# Patient Record
Sex: FEMALE | Race: WHITE | NOT HISPANIC OR LATINO | Employment: OTHER | URBAN - METROPOLITAN AREA
[De-identification: names, ages, dates, MRNs, and addresses within clinical notes are randomized per-mention and may not be internally consistent; named-entity substitution may affect disease eponyms.]

---

## 2023-10-05 ENCOUNTER — LAB REQUISITION (OUTPATIENT)
Dept: LAB | Facility: LAB | Age: 88
End: 2023-10-05
Payer: COMMERCIAL

## 2023-10-05 DIAGNOSIS — A04.72 ENTEROCOLITIS DUE TO CLOSTRIDIUM DIFFICILE, NOT SPECIFIED AS RECURRENT: ICD-10-CM

## 2023-10-05 PROCEDURE — 87493 C DIFF AMPLIFIED PROBE: CPT

## 2023-10-06 LAB — C DIF TOX TCDA+TCDB STL QL NAA+PROBE: NOT DETECTED

## 2024-01-25 ENCOUNTER — LAB (OUTPATIENT)
Dept: LAB | Facility: LAB | Age: 89
End: 2024-01-25
Payer: COMMERCIAL

## 2024-01-25 DIAGNOSIS — H53.9 UNSPECIFIED VISUAL DISTURBANCE: Primary | ICD-10-CM

## 2024-01-25 LAB — ERYTHROCYTE [SEDIMENTATION RATE] IN BLOOD BY WESTERGREN METHOD: 37 MM/H (ref 0–30)

## 2024-01-25 PROCEDURE — 36415 COLL VENOUS BLD VENIPUNCTURE: CPT

## 2024-01-25 PROCEDURE — 85652 RBC SED RATE AUTOMATED: CPT

## 2024-02-29 ENCOUNTER — LAB (OUTPATIENT)
Dept: LAB | Facility: LAB | Age: 89
End: 2024-02-29
Payer: COMMERCIAL

## 2024-02-29 DIAGNOSIS — E87.6 HYPOKALEMIA: Primary | ICD-10-CM

## 2024-02-29 DIAGNOSIS — R51.9 HEADACHE, UNSPECIFIED: ICD-10-CM

## 2024-02-29 LAB
ANION GAP SERPL CALC-SCNC: 14 MMOL/L (ref 10–20)
BUN SERPL-MCNC: 23 MG/DL (ref 6–23)
CALCIUM SERPL-MCNC: 9 MG/DL (ref 8.6–10.3)
CHLORIDE SERPL-SCNC: 104 MMOL/L (ref 98–107)
CO2 SERPL-SCNC: 27 MMOL/L (ref 21–32)
CREAT SERPL-MCNC: 0.94 MG/DL (ref 0.5–1.05)
EGFRCR SERPLBLD CKD-EPI 2021: 57 ML/MIN/1.73M*2
ERYTHROCYTE [SEDIMENTATION RATE] IN BLOOD BY WESTERGREN METHOD: 16 MM/H (ref 0–30)
GLUCOSE SERPL-MCNC: 122 MG/DL (ref 74–99)
POTASSIUM SERPL-SCNC: 4.7 MMOL/L (ref 3.5–5.3)
SODIUM SERPL-SCNC: 140 MMOL/L (ref 136–145)

## 2024-02-29 PROCEDURE — 85652 RBC SED RATE AUTOMATED: CPT

## 2024-02-29 PROCEDURE — 36415 COLL VENOUS BLD VENIPUNCTURE: CPT

## 2024-02-29 PROCEDURE — 80048 BASIC METABOLIC PNL TOTAL CA: CPT

## 2024-05-02 ENCOUNTER — APPOINTMENT (OUTPATIENT)
Dept: RADIOLOGY | Facility: HOSPITAL | Age: 89
End: 2024-05-02
Payer: COMMERCIAL

## 2024-05-02 ENCOUNTER — APPOINTMENT (OUTPATIENT)
Dept: CARDIOLOGY | Facility: HOSPITAL | Age: 89
End: 2024-05-02
Payer: COMMERCIAL

## 2024-05-02 ENCOUNTER — HOSPITAL ENCOUNTER (EMERGENCY)
Facility: HOSPITAL | Age: 89
Discharge: HOME | End: 2024-05-02
Attending: STUDENT IN AN ORGANIZED HEALTH CARE EDUCATION/TRAINING PROGRAM
Payer: COMMERCIAL

## 2024-05-02 VITALS
DIASTOLIC BLOOD PRESSURE: 92 MMHG | SYSTOLIC BLOOD PRESSURE: 199 MMHG | TEMPERATURE: 97.7 F | HEIGHT: 59 IN | WEIGHT: 148.6 LBS | HEART RATE: 63 BPM | RESPIRATION RATE: 18 BRPM | OXYGEN SATURATION: 97 % | BODY MASS INDEX: 29.96 KG/M2

## 2024-05-02 DIAGNOSIS — M79.604 PAIN IN RIGHT LEG: ICD-10-CM

## 2024-05-02 DIAGNOSIS — M51.36 LUMBAR DEGENERATIVE DISC DISEASE: Primary | ICD-10-CM

## 2024-05-02 PROCEDURE — 99285 EMERGENCY DEPT VISIT HI MDM: CPT | Mod: 25

## 2024-05-02 PROCEDURE — 93971 EXTREMITY STUDY: CPT | Performed by: INTERNAL MEDICINE

## 2024-05-02 PROCEDURE — 73564 X-RAY EXAM KNEE 4 OR MORE: CPT | Mod: RT

## 2024-05-02 PROCEDURE — 72192 CT PELVIS W/O DYE: CPT | Mod: FOREIGN READ | Performed by: RADIOLOGY

## 2024-05-02 PROCEDURE — 72131 CT LUMBAR SPINE W/O DYE: CPT

## 2024-05-02 PROCEDURE — 72131 CT LUMBAR SPINE W/O DYE: CPT | Mod: FOREIGN READ | Performed by: RADIOLOGY

## 2024-05-02 PROCEDURE — 72192 CT PELVIS W/O DYE: CPT

## 2024-05-02 PROCEDURE — 99284 EMERGENCY DEPT VISIT MOD MDM: CPT | Performed by: STUDENT IN AN ORGANIZED HEALTH CARE EDUCATION/TRAINING PROGRAM

## 2024-05-02 PROCEDURE — 93971 EXTREMITY STUDY: CPT

## 2024-05-02 RX ORDER — HYDRALAZINE HYDROCHLORIDE 20 MG/ML
5 INJECTION INTRAMUSCULAR; INTRAVENOUS ONCE
Status: DISCONTINUED | OUTPATIENT
Start: 2024-05-02 | End: 2024-05-02

## 2024-05-02 RX ORDER — METOPROLOL SUCCINATE 25 MG/1
25 TABLET, EXTENDED RELEASE ORAL ONCE
Status: DISCONTINUED | OUTPATIENT
Start: 2024-05-02 | End: 2024-05-02

## 2024-05-02 RX ORDER — METOPROLOL SUCCINATE 25 MG/1
50 TABLET, EXTENDED RELEASE ORAL DAILY
Status: DISCONTINUED | OUTPATIENT
Start: 2024-05-03 | End: 2024-05-02

## 2024-05-02 RX ORDER — METOPROLOL SUCCINATE 25 MG/1
50 TABLET, EXTENDED RELEASE ORAL ONCE
Status: DISCONTINUED | OUTPATIENT
Start: 2024-05-02 | End: 2024-05-02

## 2024-05-02 RX ORDER — KETOROLAC TROMETHAMINE 15 MG/ML
15 INJECTION, SOLUTION INTRAMUSCULAR; INTRAVENOUS ONCE
Status: DISCONTINUED | OUTPATIENT
Start: 2024-05-02 | End: 2024-05-02

## 2024-05-02 ASSESSMENT — PAIN SCALES - GENERAL
PAINLEVEL_OUTOF10: 7
PAINLEVEL_OUTOF10: 0 - NO PAIN
PAINLEVEL_OUTOF10: 8

## 2024-05-02 ASSESSMENT — COLUMBIA-SUICIDE SEVERITY RATING SCALE - C-SSRS
2. HAVE YOU ACTUALLY HAD ANY THOUGHTS OF KILLING YOURSELF?: NO
1. IN THE PAST MONTH, HAVE YOU WISHED YOU WERE DEAD OR WISHED YOU COULD GO TO SLEEP AND NOT WAKE UP?: NO
6. HAVE YOU EVER DONE ANYTHING, STARTED TO DO ANYTHING, OR PREPARED TO DO ANYTHING TO END YOUR LIFE?: NO

## 2024-05-02 ASSESSMENT — LIFESTYLE VARIABLES
HAVE YOU EVER FELT YOU SHOULD CUT DOWN ON YOUR DRINKING: NO
HAVE PEOPLE ANNOYED YOU BY CRITICIZING YOUR DRINKING: NO
TOTAL SCORE: 0
EVER FELT BAD OR GUILTY ABOUT YOUR DRINKING: NO
EVER HAD A DRINK FIRST THING IN THE MORNING TO STEADY YOUR NERVES TO GET RID OF A HANGOVER: NO

## 2024-05-02 ASSESSMENT — PAIN - FUNCTIONAL ASSESSMENT
PAIN_FUNCTIONAL_ASSESSMENT: 0-10
PAIN_FUNCTIONAL_ASSESSMENT: 0-10

## 2024-05-03 NOTE — ED PROVIDER NOTES
EMERGENCY DEPARTMENT ENCOUNTER      Pt Name: Rufus Antonio  MRN: [id number]  Birthdate 10/10/1930  Date of evaluation: 5/2/2024  Provider: Varun Bravo MD    CHIEF COMPLAINT       Chief Complaint   Patient presents with    Hip Pain     Right leg pain         HISTORY OF PRESENT ILLNESS    HPI  Patient is a 89-year-old female history of A-fib on Xarelto, HFpEF, CKD, sick sinus syndrome status post pacemaker, polymyalgia rheumatica, HTN presenting with right leg pain.  Approximately 2 weeks ago, she had a presyncopal event, bent over and felt a popping sensation in her lower back/hip.  Since then, she has been having worsening pain down the right leg.  It originates in the low back/glutes and radiates down to the knee.  Pain is 6/10, sharp, worsened with walking/standing, without consistent alleviating factors.  She is given minimal relief from over-the-counter medications.  She is having worsening difficulty getting around her apartment.  She has been furniture surfing.  She is otherwise asymptomatic.    Nursing Notes were reviewed.    PAST MEDICAL HISTORY   History reviewed. No pertinent past medical history.      SURGICAL HISTORY     History reviewed. No pertinent surgical history.      CURRENT MEDICATIONS       Discharge Medication List as of 5/2/2024 11:04 PM        CONTINUE these medications which have NOT CHANGED    Details   potassium chloride CR (Klor-Con) 10 mEq ER tablet TAKE 1 TABLET BY MOUTH ONCE DAILY, Starting Tue 9/19/2023, Until Wed 9/18/2024, Normal      vancomycin (Vancocin) 125 mg capsule TAKE 1 CAPSULE BY MOUTH EVERY 6 HOURS FOR 8 DAYS THEN TAKE 1 CAPSULE EVERY 8 HOURS FOR 1 WEEK THEN TAKE 1 CAPSULE EVERY 12 HOURS FOR 1 WEEK THEN TAKE 1 CAPSULE ONCE DAILY FOR 1 WEEK, THEN 1 CAPSULE EVERY OTHER DAY FOR 1 WEEK, Starting Tue 9/19/2023, Unti l Wed 9/18/2024 at 2359, Normal             ALLERGIES     Patient has no known allergies.    FAMILY HISTORY     No family history on file.       SOCIAL HISTORY        Social History     Socioeconomic History    Marital status:      Spouse name: None    Number of children: None    Years of education: None    Highest education level: None   Occupational History    None   Tobacco Use    Smoking status: None    Smokeless tobacco: None   Substance and Sexual Activity    Alcohol use: None    Drug use: None    Sexual activity: None   Other Topics Concern    None   Social History Narrative    None     Social Determinants of Health     Financial Resource Strain: Low Risk  (10/24/2023)    Received from [facility]    Overall Financial Resource Strain (CARDIA)     Difficulty of Paying Living Expenses: Not hard at all   Food Insecurity: No Food Insecurity (10/24/2023)    Received from [facility]    Hunger Vital Sign     Worried About Running Out of Food in the Last Year: Never true     Ran Out of Food in the Last Year: Never true   Transportation Needs: No Transportation Needs (10/24/2023)    Received from [facility]    PRAPARE - Transportation     Lack of Transportation (Medical): No     Lack of Transportation (Non-Medical): No   Physical Activity: Inactive (11/14/2022)    Received from [facility]    Exercise Vital Sign     Days of Exercise per Week: 0 days     Minutes of Exercise per Session: 0 min   Stress: No Stress Concern Present (10/24/2023)    Received from [facility]    [place] of [employer] - Occupational Stress Questionnaire     Feeling of Stress : Only a little   Social Connections: Moderately Integrated (11/14/2022)    Received from [facility]    Social Connection and Isolation Panel [NHANES]     Frequency of Communication with Friends and Family: Three times a week     Frequency of Social Gatherings with Friends and Family: Twice a week     Attends Jainism Services: 1 to 4 times per year     Active Member of Clubs or Organizations: No     Attends Club or Organization Meetings: 1 to 4 times per year     Marital  Status:    Intimate Partner Violence: Not on file   Housing Stability: Low Risk  (4/14/2023)    Received from [facility]    Housing Stability Vital Sign     Unable to Pay for Housing in the Last Year: No     Number of Places Lived in the Last Year: 1     Unstable Housing in the Last Year: No       SCREENINGS                        PHYSICAL EXAM    (up to 7 for level 4, 8 or more for level 5)     ED Triage Vitals [05/02/24 1733]   Temperature Heart Rate Respirations BP   36.5 °C (97.7 °F) 82 18 176/80      Pulse Ox Temp Source Heart Rate Source Patient Position   99 % Temporal Monitor Sitting      BP Location FiO2 (%)     Right arm --       Physical Exam  Vitals and nursing note reviewed.   Constitutional:       General: She is not in acute distress.     Appearance: She is not toxic-appearing.   HENT:      Head: Normocephalic and atraumatic.      Nose: Nose normal.      Mouth/Throat:      Mouth: Mucous membranes are moist.      Pharynx: Oropharynx is clear.   Eyes:      Extraocular Movements: Extraocular movements intact.      Conjunctiva/sclera: Conjunctivae normal.   Cardiovascular:      Rate and Rhythm: Normal rate and regular rhythm.      Pulses: Normal pulses.      Heart sounds: Normal heart sounds.   Pulmonary:      Effort: Pulmonary effort is normal. No respiratory distress.      Breath sounds: Normal breath sounds.   Abdominal:      General: There is no distension.      Palpations: Abdomen is soft.      Tenderness: There is no abdominal tenderness.   Musculoskeletal:      Cervical back: Normal range of motion and neck supple.      Comments: Pain to palpation along the lower lumbar spine.  No palpable step-offs or deformities.  No pain to palpation of the hip or greater trochanter.  Pain to palpation of the posterior knee.   Skin:     General: Skin is warm and dry.   Neurological:      General: No focal deficit present.          DIAGNOSTIC RESULTS     LABS:  Labs Reviewed - No data to  display    All other labs were within normal range or not returned as of this dictation.    Imaging  CT lumbar spine wo IV contrast   Final Result   1.  No acute lumbar vertebral body compression/fracture.   2.  Multilevel degenerative disc disease of the lumbar spine as   described above.   3.  Mild central canal stenosis noted at the L3-L4 and L4-L5 levels.   4.  Bilateral neuroforamina narrowing at the L3-L4, L4-L5.  There is     left neuroforaminal narrowing noted at the L5-S1 level.   5.  No acute intrapelvic fracture or dislocation visualized.   .   Signed by John Wu MD      CT pelvis wo IV contrast   Final Result   1.  No acute lumbar vertebral body compression/fracture.   2.  Multilevel degenerative disc disease of the lumbar spine as   described above.   3.  Mild central canal stenosis noted at the L3-L4 and L4-L5 levels.   4.  Bilateral neuroforamina narrowing at the L3-L4, L4-L5.  There is     left neuroforaminal narrowing noted at the L5-S1 level.   5.  No acute intrapelvic fracture or dislocation visualized.   .   Signed by John Wu MD      Vascular US lower extremity venous duplex right   Final Result      XR knee right 4+ views   Final Result   Degenerative changes without osseous injury evident.        MACRO:   None        Signed by: Donald Bliss 5/2/2024 8:46 PM   Dictation workstation:   [id number]           Procedures  Procedures     EMERGENCY DEPARTMENT COURSE/MDM:     Diagnoses as of 05/03/24 0125   Lumbar degenerative disc disease        Medical Decision Making  History obtained from the patient and her daughter.  Records including labs, imaging, notes reviewed.  Patient declined pain medications.  Primary concern for possible fracture of the lumbar spine, right hip, right knee, DVT.  Venous duplex of the right lower extremity was negative for DVT.  CT lumbar spine, pelvis, x-ray of the right knee were negative for acute fractures.  Degenerative changes only.  Patient had requested  that she receive her blood pressure medication as she was persistently hypertensive in the ER and declined a one-time push.  We attempted multiple times to find the correct dosage as they were unaware of it themselves and all of her history to the [facility].  Reportedly initially 25 mg ultimately 50.  These were ordered with the patient's results ultimately came in before she was to receive the dose.  Patient was asymptomatic throughout.  She was ultimately discharged home in satisfactory condition with instruction for supportive measures and to take her p.m. metoprolol.  All questions answered and return precautions discussed.    Patient and or family in agreement and understanding of treatment plan.  All questions answered.      I reviewed the case with the attending ED physician. The attending ED physician agrees with the plan. Patient and/or patient´s representative was counseled regarding labs, imaging, likely diagnosis, and plan. All questions were answered.    ED Medications administered this visit:    Medications - No data to display      New Prescriptions from this visit:    Discharge Medication List as of 5/2/2024 11:04 PM          Follow-up:  SARANYA Bell MD  [address]  [place] [address]  [phone]    Call in 2 days  Follow up with primary care physician in the next 48 hours. Return to ED if experiencing worsening symptoms, back pain, numbness, weakness, loss of bowel or bladder control, numbness in groin, inability to walk        Final Impression:   1. Lumbar degenerative disc disease    2. Pain in right leg          (Please note that portions of this note were completed with a voice recognition program.  Efforts were made to edit the dictations but occasionally words are mis-transcribed.)     Varun Bravo MD  Resident  05/03/24 0126

## 2024-05-22 ENCOUNTER — HOSPITAL ENCOUNTER (EMERGENCY)
Facility: HOSPITAL | Age: 89
Discharge: HOME | End: 2024-05-22
Attending: EMERGENCY MEDICINE
Payer: COMMERCIAL

## 2024-05-22 ENCOUNTER — APPOINTMENT (OUTPATIENT)
Dept: CARDIOLOGY | Facility: HOSPITAL | Age: 89
End: 2024-05-22
Payer: COMMERCIAL

## 2024-05-22 ENCOUNTER — APPOINTMENT (OUTPATIENT)
Dept: RADIOLOGY | Facility: HOSPITAL | Age: 89
End: 2024-05-22
Payer: COMMERCIAL

## 2024-05-22 VITALS
WEIGHT: 142 LBS | TEMPERATURE: 97.3 F | DIASTOLIC BLOOD PRESSURE: 94 MMHG | HEIGHT: 59 IN | OXYGEN SATURATION: 96 % | BODY MASS INDEX: 28.63 KG/M2 | SYSTOLIC BLOOD PRESSURE: 205 MMHG | HEART RATE: 70 BPM | RESPIRATION RATE: 18 BRPM

## 2024-05-22 DIAGNOSIS — N30.90 CYSTITIS: Primary | ICD-10-CM

## 2024-05-22 LAB
ALBUMIN SERPL BCP-MCNC: 4.1 G/DL (ref 3.4–5)
ALP SERPL-CCNC: 46 U/L (ref 33–136)
ALT SERPL W P-5'-P-CCNC: 18 U/L (ref 7–45)
ANION GAP SERPL CALC-SCNC: 15 MMOL/L (ref 10–20)
APPEARANCE UR: CLEAR
AST SERPL W P-5'-P-CCNC: 25 U/L (ref 9–39)
BACTERIA #/AREA URNS AUTO: ABNORMAL /HPF
BILIRUB SERPL-MCNC: 0.7 MG/DL (ref 0–1.2)
BILIRUB UR STRIP.AUTO-MCNC: NEGATIVE MG/DL
BUN SERPL-MCNC: 18 MG/DL (ref 6–23)
CALCIUM SERPL-MCNC: 9.2 MG/DL (ref 8.6–10.3)
CHLORIDE SERPL-SCNC: 100 MMOL/L (ref 98–107)
CO2 SERPL-SCNC: 27 MMOL/L (ref 21–32)
COLOR UR: ABNORMAL
CREAT SERPL-MCNC: 0.92 MG/DL (ref 0.5–1.05)
EGFRCR SERPLBLD CKD-EPI 2021: 58 ML/MIN/1.73M*2
ERYTHROCYTE [DISTWIDTH] IN BLOOD BY AUTOMATED COUNT: 13.8 % (ref 11.5–14.5)
GLUCOSE SERPL-MCNC: 126 MG/DL (ref 74–99)
GLUCOSE UR STRIP.AUTO-MCNC: NORMAL MG/DL
HCT VFR BLD AUTO: 38.9 % (ref 36–46)
HGB BLD-MCNC: 12.6 G/DL (ref 12–16)
KETONES UR STRIP.AUTO-MCNC: NEGATIVE MG/DL
LEUKOCYTE ESTERASE UR QL STRIP.AUTO: ABNORMAL
MCH RBC QN AUTO: 34.6 PG (ref 26–34)
MCHC RBC AUTO-ENTMCNC: 32.4 G/DL (ref 32–36)
MCV RBC AUTO: 107 FL (ref 80–100)
NITRITE UR QL STRIP.AUTO: NEGATIVE
NRBC BLD-RTO: 0 /100 WBCS (ref 0–0)
PH UR STRIP.AUTO: 5.5 [PH]
PLATELET # BLD AUTO: 190 X10*3/UL (ref 150–450)
POTASSIUM SERPL-SCNC: 3.8 MMOL/L (ref 3.5–5.3)
PROT SERPL-MCNC: 7.3 G/DL (ref 6.4–8.2)
PROT UR STRIP.AUTO-MCNC: NEGATIVE MG/DL
RBC # BLD AUTO: 3.64 X10*6/UL (ref 4–5.2)
RBC # UR STRIP.AUTO: ABNORMAL /UL
RBC #/AREA URNS AUTO: ABNORMAL /HPF
SODIUM SERPL-SCNC: 138 MMOL/L (ref 136–145)
SP GR UR STRIP.AUTO: 1.01
UROBILINOGEN UR STRIP.AUTO-MCNC: NORMAL MG/DL
WBC # BLD AUTO: 8 X10*3/UL (ref 4.4–11.3)
WBC #/AREA URNS AUTO: >50 /HPF
WBC CLUMPS #/AREA URNS AUTO: ABNORMAL /HPF

## 2024-05-22 PROCEDURE — 85027 COMPLETE CBC AUTOMATED: CPT | Performed by: EMERGENCY MEDICINE

## 2024-05-22 PROCEDURE — 2550000001 HC RX 255 CONTRASTS: Performed by: EMERGENCY MEDICINE

## 2024-05-22 PROCEDURE — 80053 COMPREHEN METABOLIC PANEL: CPT | Performed by: EMERGENCY MEDICINE

## 2024-05-22 PROCEDURE — 74177 CT ABD & PELVIS W/CONTRAST: CPT

## 2024-05-22 PROCEDURE — 81003 URINALYSIS AUTO W/O SCOPE: CPT | Performed by: EMERGENCY MEDICINE

## 2024-05-22 PROCEDURE — 93005 ELECTROCARDIOGRAM TRACING: CPT

## 2024-05-22 PROCEDURE — 74177 CT ABD & PELVIS W/CONTRAST: CPT | Mod: FOREIGN READ | Performed by: RADIOLOGY

## 2024-05-22 PROCEDURE — 36415 COLL VENOUS BLD VENIPUNCTURE: CPT | Performed by: EMERGENCY MEDICINE

## 2024-05-22 PROCEDURE — 87086 URINE CULTURE/COLONY COUNT: CPT | Mod: STJLAB | Performed by: EMERGENCY MEDICINE

## 2024-05-22 PROCEDURE — 99285 EMERGENCY DEPT VISIT HI MDM: CPT | Mod: 25

## 2024-05-22 PROCEDURE — 99285 EMERGENCY DEPT VISIT HI MDM: CPT | Performed by: EMERGENCY MEDICINE

## 2024-05-22 RX ORDER — METRONIDAZOLE 500 MG/1
500 TABLET ORAL 3 TIMES DAILY
Qty: 30 TABLET | Refills: 0 | Status: SHIPPED | OUTPATIENT
Start: 2024-05-22 | End: 2024-06-01

## 2024-05-22 RX ORDER — CEPHALEXIN 500 MG/1
500 CAPSULE ORAL 2 TIMES DAILY
Qty: 14 CAPSULE | Refills: 0 | Status: SHIPPED | OUTPATIENT
Start: 2024-05-22 | End: 2024-05-22 | Stop reason: WASHOUT

## 2024-05-22 RX ORDER — METRONIDAZOLE 500 MG/1
500 TABLET ORAL 3 TIMES DAILY
Qty: 30 TABLET | Refills: 0 | Status: SHIPPED | OUTPATIENT
Start: 2024-05-22 | End: 2024-05-22 | Stop reason: WASHOUT

## 2024-05-22 RX ORDER — CEPHALEXIN 500 MG/1
500 CAPSULE ORAL 2 TIMES DAILY
Qty: 14 CAPSULE | Refills: 0 | Status: SHIPPED | OUTPATIENT
Start: 2024-05-22 | End: 2024-05-29

## 2024-05-22 RX ADMIN — IOHEXOL 75 ML: 350 INJECTION, SOLUTION INTRAVENOUS at 20:35

## 2024-05-22 ASSESSMENT — PAIN DESCRIPTION - PROGRESSION: CLINICAL_PROGRESSION: NOT CHANGED

## 2024-05-22 ASSESSMENT — PAIN SCALES - GENERAL
PAINLEVEL_OUTOF10: 0 - NO PAIN
PAINLEVEL_OUTOF10: 0 - NO PAIN

## 2024-05-22 ASSESSMENT — COLUMBIA-SUICIDE SEVERITY RATING SCALE - C-SSRS
6. HAVE YOU EVER DONE ANYTHING, STARTED TO DO ANYTHING, OR PREPARED TO DO ANYTHING TO END YOUR LIFE?: NO
1. IN THE PAST MONTH, HAVE YOU WISHED YOU WERE DEAD OR WISHED YOU COULD GO TO SLEEP AND NOT WAKE UP?: NO
2. HAVE YOU ACTUALLY HAD ANY THOUGHTS OF KILLING YOURSELF?: NO

## 2024-05-22 ASSESSMENT — PAIN - FUNCTIONAL ASSESSMENT: PAIN_FUNCTIONAL_ASSESSMENT: 0-10

## 2024-05-22 NOTE — ED PROVIDER NOTES
HPI   Chief Complaint   Patient presents with    Difficulty Urinating     Pain when urinating       This is a 89-year-old female who presents to ER with chief complaint of pain with urination.  This started last Monday, 9 days ago.  She saw her primary care doctor on Thursday and he did urine studies from the office and then she had lab work done on Saturday.  Patient does had C. Difficile infection in September of this past year.  Her primary care doctor was not wanting to start her on antibiotics unless absolutely necessary.  They did refer her to infectious disease and Dr. Rivera wanted her to come to the office but she did not want to go.  She contacted her primary care doctor again and they advised her to come to the ER to get a repeat urine study done.  Patient was just seen in the ER around 2 weeks ago because she was having back pain and right hip pain.  She states that she got lightheaded with this pain.  She did not follow-up with orthopedics for this pain.  She still having occasional back pain.  She states her stools been chronically loose since she had C. difficile.  There is no fevers or chills or diaphoresis.  No sore throat or nasal congestion or drainage.  No chest pain or shortness of breath.  No cough.  No anterior abdominal pain currently.  She does get occasional suprapubic pain.  No nausea or vomiting.  No dizziness currently.  She does get occasional dizziness when her pain gets worse.    Past Medical History: C. difficile, urinary tract infection, hypertension and hypotension.  She is on midodrine and metoprolol.  No diabetes.    Surgical History: Bladder sling    Medications: See Nursing Notes    Allergies: Codeine    Social History: No tobacco or drugs or alcohol    Primary Care Physician:  Gerhard    Unless otherwise stated in this report the patient's positive and negative responses for review of systems for constitutional, eyes, ENT, cardiovascular, respiratory, gastrointestinal,  neurological, genitourinary, musculoskeletal, and integument systems and related systems to the presenting problem are either as stated in the HPI or were not pertinent or were negative for the symptoms and/or complaints related to the presenting medical problem.      EXAM:  Vital signs reviewed  General:  Appears well  Alert  HEENT:  Head atraumatic  Eyes normal inspection  Normal ENT inspection  NECK:  Normal inspection  RESPIRATORY:  Normal breath sounds  No chest wall tenderness  No respiratory distress  CVS:  Heart rate and rhythm regular  No Murmurs  ABDOMEN/GI:  Soft  Non-tender, no reproducible pain to palpation  Bowel sounds normal  FEMALE GENITAL:  []  MALE GENITAL:  []  RECTAL:  []  BACK:  Normal inspection, no midline tenderness  EXTREMITIES:  Non-Tender  Full ROM  Normal appearance  NEURO:  Alert and oriented X 3  CN's normal as tested  Sensation normal  Motor normal  PSYCH:  Mood normal  Affect normal  SKIN:  Color normal  No rash  Warm  Dry                                Perry Coma Scale Score: 15                     Patient History   History reviewed. No pertinent past medical history.  History reviewed. No pertinent surgical history.  No family history on file.  Social History     Tobacco Use    Smoking status: Not on file    Smokeless tobacco: Not on file   Substance Use Topics    Alcohol use: Not on file    Drug use: Not on file       Physical Exam   ED Triage Vitals [05/22/24 1614]   Temperature Heart Rate Respirations BP   36.3 °C (97.3 °F) 90 16 (!) 204/95      Pulse Ox Temp Source Heart Rate Source Patient Position   94 % Temporal Monitor Sitting      BP Location FiO2 (%)     Right arm --       Physical Exam    ED Course & MDM   Diagnoses as of 05/23/24 0829   Cystitis       Medical Decision Making  Urine does show 1+ bacteria with greater than 50 white cells.  No squamous cells.  There is 500 leukocyte esterase and nitrites are negative.  Urine culture is ordered.    We did discuss  empirically starting her on an antibiotic that has less likelihood of causing C. difficile such as Keflex.  We also did discuss potentially starting her on Flagyl empirically.  We did discuss that Flagyl has had increasing resistance to C. difficile, but it is less expensive than oral vancomycin.  Patient prefers to wait for her culture to come back.    Due to this pain she was having we did do CT imaging with contrast.  If the patient does have pyelonephritis we will start antibiotics now.  Patient has been having these urinary symptoms for around 9 days now.  If this was a significant urinary tract infection, it would most likely have led to pyelonephritis or significant cystitis by now.    GFR is 58.  Glucose is 126.  No leukocytosis.  No significant anemia.    EKG: Electronic atrial pacemaker with a rate of 64.  PA interval 218.  QTc 406.  No ST changes.    Patient states she has a history of very erratic blood pressure.  She states her cardiologist keeps her at an elevated blood pressure.  She states it is normally around 1 70-1 80 at her bedtime.  She has not taken her evening metoprolol tonight.  She states that she does have issues with orthostasis and dropping her blood pressure 50-60 points.  It is not abnormal for her to have a blood pressure in the 190s.    GFR is 58.  Rest of the chemistry is unremarkable.  No leukocytosis.  No significant anemia.    CT:  Questionable inflammatory changes in a decompressed bladder.  Please   correlate for component of cystitis/urinary tract infection.   No acute plantar changes in the abdomen or pelvis.   Advanced sigmoid colon diverticulosis without evidence of   diverticulitis.   Left adnexal cysts measures 1.5 cm.     I did discuss the CT findings and UA findings with patient and her daughter.  With her having these findings, I did feel that we should start abx.  We did discuss starting flagyl with keflex to try to decrease risk of C. Diff.  We also discussed taking  a probiotic.    Prescriptions were sent.    Patient will decide if she is going to take antibiotics vs waiting for culture results and reviewing with her primary care and ID team.  Patient is discharged home.  She is to follow-up with urology and infectious disease and primary care.  She is to return to the nearest ER for any new or worsening symptoms.  She is happy with this plan.  She will follow the results of her urine culture with her doctors.          Procedure  Procedures     Keegan Jj,   05/23/24 0832

## 2024-05-23 LAB — HOLD SPECIMEN: NORMAL

## 2024-05-23 NOTE — DISCHARGE INSTRUCTIONS
Seek immediate medical attention if you develop:  worsening pain with urination, abdominal pain, new or worsening nausea, new or worsening vomiting, new or worsening diarrhea, chest pain, shortness of breath, pain with urination, problems urinating, fever, chills, weakness, or any new or worsening symptoms.    Follow up the results of your urine culture with your doctors.    Follow up your elevated blood pressure with your doctor.

## 2024-05-24 LAB — BACTERIA UR CULT: NORMAL

## 2024-05-26 LAB
ATRIAL RATE: 64 BPM
P AXIS: 95 DEGREES
P OFFSET: 145 MS
P ONSET: 121 MS
PR INTERVAL: 218 MS
Q ONSET: 230 MS
QRS COUNT: 10 BEATS
QRS DURATION: 78 MS
QT INTERVAL: 394 MS
QTC CALCULATION(BAZETT): 406 MS
QTC FREDERICIA: 402 MS
R AXIS: -13 DEGREES
T AXIS: 152 DEGREES
T OFFSET: 427 MS
VENTRICULAR RATE: 64 BPM

## 2024-10-21 ENCOUNTER — APPOINTMENT (OUTPATIENT)
Dept: CARDIOLOGY | Facility: HOSPITAL | Age: 89
End: 2024-10-21
Payer: COMMERCIAL

## 2024-10-21 ENCOUNTER — APPOINTMENT (OUTPATIENT)
Dept: RADIOLOGY | Facility: HOSPITAL | Age: 89
End: 2024-10-21
Payer: COMMERCIAL

## 2024-10-21 ENCOUNTER — HOSPITAL ENCOUNTER (EMERGENCY)
Facility: HOSPITAL | Age: 89
Discharge: HOME | End: 2024-10-21
Attending: STUDENT IN AN ORGANIZED HEALTH CARE EDUCATION/TRAINING PROGRAM
Payer: COMMERCIAL

## 2024-10-21 VITALS
DIASTOLIC BLOOD PRESSURE: 74 MMHG | SYSTOLIC BLOOD PRESSURE: 140 MMHG | BODY MASS INDEX: 30.24 KG/M2 | TEMPERATURE: 97.9 F | OXYGEN SATURATION: 97 % | WEIGHT: 150 LBS | HEIGHT: 59 IN | RESPIRATION RATE: 18 BRPM | HEART RATE: 71 BPM

## 2024-10-21 DIAGNOSIS — K57.92 DIVERTICULITIS: Primary | ICD-10-CM

## 2024-10-21 LAB
ALBUMIN SERPL BCP-MCNC: 3.7 G/DL (ref 3.4–5)
ALP SERPL-CCNC: 52 U/L (ref 33–136)
ALT SERPL W P-5'-P-CCNC: 24 U/L (ref 7–45)
ANION GAP SERPL CALC-SCNC: 12 MMOL/L (ref 10–20)
APPEARANCE UR: CLEAR
AST SERPL W P-5'-P-CCNC: 32 U/L (ref 9–39)
BASOPHILS # BLD AUTO: 0.06 X10*3/UL (ref 0–0.1)
BASOPHILS NFR BLD AUTO: 0.8 %
BILIRUB SERPL-MCNC: 0.5 MG/DL (ref 0–1.2)
BILIRUB UR STRIP.AUTO-MCNC: NEGATIVE MG/DL
BUN SERPL-MCNC: 16 MG/DL (ref 6–23)
CALCIUM SERPL-MCNC: 9.3 MG/DL (ref 8.6–10.3)
CARDIAC TROPONIN I PNL SERPL HS: 8 NG/L (ref 0–13)
CHLORIDE SERPL-SCNC: 105 MMOL/L (ref 98–107)
CO2 SERPL-SCNC: 29 MMOL/L (ref 21–32)
COLOR UR: YELLOW
CREAT SERPL-MCNC: 0.97 MG/DL (ref 0.5–1.05)
EGFRCR SERPLBLD CKD-EPI 2021: 54 ML/MIN/1.73M*2
EOSINOPHIL # BLD AUTO: 0.15 X10*3/UL (ref 0–0.4)
EOSINOPHIL NFR BLD AUTO: 2 %
ERYTHROCYTE [DISTWIDTH] IN BLOOD BY AUTOMATED COUNT: 13.9 % (ref 11.5–14.5)
GLUCOSE SERPL-MCNC: 96 MG/DL (ref 74–99)
GLUCOSE UR STRIP.AUTO-MCNC: NORMAL MG/DL
HCT VFR BLD AUTO: 35.7 % (ref 36–46)
HGB BLD-MCNC: 11.8 G/DL (ref 12–16)
HOLD SPECIMEN: NORMAL
HYALINE CASTS #/AREA URNS AUTO: ABNORMAL /LPF
IMM GRANULOCYTES # BLD AUTO: 0.02 X10*3/UL (ref 0–0.5)
IMM GRANULOCYTES NFR BLD AUTO: 0.3 % (ref 0–0.9)
KETONES UR STRIP.AUTO-MCNC: NEGATIVE MG/DL
LACTATE SERPL-SCNC: 1.4 MMOL/L (ref 0.4–2)
LEUKOCYTE ESTERASE UR QL STRIP.AUTO: ABNORMAL
LIPASE SERPL-CCNC: 25 U/L (ref 9–82)
LYMPHOCYTES # BLD AUTO: 1.7 X10*3/UL (ref 0.8–3)
LYMPHOCYTES NFR BLD AUTO: 22.8 %
MCH RBC QN AUTO: 34.8 PG (ref 26–34)
MCHC RBC AUTO-ENTMCNC: 33.1 G/DL (ref 32–36)
MCV RBC AUTO: 105 FL (ref 80–100)
MONOCYTES # BLD AUTO: 1.1 X10*3/UL (ref 0.05–0.8)
MONOCYTES NFR BLD AUTO: 14.7 %
MUCOUS THREADS #/AREA URNS AUTO: ABNORMAL /LPF
NEUTROPHILS # BLD AUTO: 4.44 X10*3/UL (ref 1.6–5.5)
NEUTROPHILS NFR BLD AUTO: 59.4 %
NITRITE UR QL STRIP.AUTO: NEGATIVE
NRBC BLD-RTO: 0 /100 WBCS (ref 0–0)
PH UR STRIP.AUTO: 5.5 [PH]
PLATELET # BLD AUTO: 193 X10*3/UL (ref 150–450)
POTASSIUM SERPL-SCNC: 4.1 MMOL/L (ref 3.5–5.3)
PROT SERPL-MCNC: 6.9 G/DL (ref 6.4–8.2)
PROT UR STRIP.AUTO-MCNC: ABNORMAL MG/DL
RBC # BLD AUTO: 3.39 X10*6/UL (ref 4–5.2)
RBC # UR STRIP.AUTO: NEGATIVE /UL
RBC #/AREA URNS AUTO: ABNORMAL /HPF
SODIUM SERPL-SCNC: 142 MMOL/L (ref 136–145)
SP GR UR STRIP.AUTO: 1.02
SQUAMOUS #/AREA URNS AUTO: ABNORMAL /HPF
UROBILINOGEN UR STRIP.AUTO-MCNC: NORMAL MG/DL
WBC # BLD AUTO: 7.5 X10*3/UL (ref 4.4–11.3)
WBC #/AREA URNS AUTO: ABNORMAL /HPF

## 2024-10-21 PROCEDURE — 83690 ASSAY OF LIPASE: CPT

## 2024-10-21 PROCEDURE — 36415 COLL VENOUS BLD VENIPUNCTURE: CPT

## 2024-10-21 PROCEDURE — 99285 EMERGENCY DEPT VISIT HI MDM: CPT | Mod: 25

## 2024-10-21 PROCEDURE — 2500000004 HC RX 250 GENERAL PHARMACY W/ HCPCS (ALT 636 FOR OP/ED)

## 2024-10-21 PROCEDURE — 83605 ASSAY OF LACTIC ACID: CPT

## 2024-10-21 PROCEDURE — 85025 COMPLETE CBC W/AUTO DIFF WBC: CPT

## 2024-10-21 PROCEDURE — 73600 X-RAY EXAM OF ANKLE: CPT | Mod: BILATERAL PROCEDURE | Performed by: RADIOLOGY

## 2024-10-21 PROCEDURE — 2550000001 HC RX 255 CONTRASTS: Performed by: STUDENT IN AN ORGANIZED HEALTH CARE EDUCATION/TRAINING PROGRAM

## 2024-10-21 PROCEDURE — 81001 URINALYSIS AUTO W/SCOPE: CPT

## 2024-10-21 PROCEDURE — 87086 URINE CULTURE/COLONY COUNT: CPT | Mod: STJLAB

## 2024-10-21 PROCEDURE — 73600 X-RAY EXAM OF ANKLE: CPT | Mod: 50

## 2024-10-21 PROCEDURE — 84484 ASSAY OF TROPONIN QUANT: CPT | Performed by: STUDENT IN AN ORGANIZED HEALTH CARE EDUCATION/TRAINING PROGRAM

## 2024-10-21 PROCEDURE — 74177 CT ABD & PELVIS W/CONTRAST: CPT

## 2024-10-21 PROCEDURE — 2500000001 HC RX 250 WO HCPCS SELF ADMINISTERED DRUGS (ALT 637 FOR MEDICARE OP): Performed by: STUDENT IN AN ORGANIZED HEALTH CARE EDUCATION/TRAINING PROGRAM

## 2024-10-21 PROCEDURE — 93005 ELECTROCARDIOGRAM TRACING: CPT

## 2024-10-21 PROCEDURE — 74177 CT ABD & PELVIS W/CONTRAST: CPT | Mod: FOREIGN READ | Performed by: RADIOLOGY

## 2024-10-21 PROCEDURE — 80053 COMPREHEN METABOLIC PANEL: CPT

## 2024-10-21 RX ORDER — DIPHENHYDRAMINE HYDROCHLORIDE 50 MG/ML
50 INJECTION INTRAMUSCULAR; INTRAVENOUS ONCE
Status: DISCONTINUED | OUTPATIENT
Start: 2024-10-21 | End: 2024-10-21 | Stop reason: HOSPADM

## 2024-10-21 RX ORDER — ACETAMINOPHEN 325 MG/1
975 TABLET ORAL ONCE
Status: COMPLETED | OUTPATIENT
Start: 2024-10-21 | End: 2024-10-21

## 2024-10-21 RX ORDER — OXYCODONE HYDROCHLORIDE 5 MG/1
5 TABLET ORAL EVERY 6 HOURS PRN
Qty: 10 TABLET | Refills: 0 | Status: SHIPPED | OUTPATIENT
Start: 2024-10-21 | End: 2024-10-26

## 2024-10-21 RX ORDER — ACETAMINOPHEN 325 MG/1
650 TABLET ORAL ONCE
Status: DISCONTINUED | OUTPATIENT
Start: 2024-10-21 | End: 2024-10-21 | Stop reason: HOSPADM

## 2024-10-21 ASSESSMENT — PAIN SCALES - GENERAL
PAINLEVEL_OUTOF10: 3
PAINLEVEL_OUTOF10: 7
PAINLEVEL_OUTOF10: 0 - NO PAIN

## 2024-10-21 ASSESSMENT — PAIN - FUNCTIONAL ASSESSMENT: PAIN_FUNCTIONAL_ASSESSMENT: 0-10

## 2024-10-21 ASSESSMENT — PAIN DESCRIPTION - FREQUENCY: FREQUENCY: CONSTANT/CONTINUOUS

## 2024-10-21 ASSESSMENT — PAIN DESCRIPTION - DESCRIPTORS: DESCRIPTORS: THROBBING;SHARP

## 2024-10-21 ASSESSMENT — PAIN DESCRIPTION - ONSET: ONSET: ONGOING

## 2024-10-21 ASSESSMENT — PAIN DESCRIPTION - PAIN TYPE: TYPE: ACUTE PAIN

## 2024-10-21 ASSESSMENT — PAIN DESCRIPTION - LOCATION: LOCATION: ABDOMEN

## 2024-10-21 ASSESSMENT — PAIN DESCRIPTION - PROGRESSION: CLINICAL_PROGRESSION: GRADUALLY WORSENING

## 2024-10-21 ASSESSMENT — PAIN DESCRIPTION - ORIENTATION: ORIENTATION: LOWER

## 2024-10-21 NOTE — ED PROVIDER NOTES
EMERGENCY DEPARTMENT ENCOUNTER      Pt Name: Rufus Antonio  MRN: [id number]  Birthdate 10/10/1930  Date of evaluation: 10/21/2024    HISTORY OF PRESENT ILLNESS    Rufus Antonio is an 89 y.o. female with history including A-fib on Xarelto, sick sinus syndrome status post pacer, HFpEF, polymyalgia rheumatica/fibromyalgia, hypertension, CKD, prior hysterectomy and bladder suspension procedures, and lumbar degenerative disc disease presenting to the emergency department for weeks of pelvic pain that is acutely worsened around 4 days ago. She states the pain is sharp in nature and worse upon sitting down.  She also notes that the pain sometimes radiates down to her inner thighs and to her rectum area.  She denies any constipation, diarrhea, chest pain, shortness of breath, nausea or vomiting.  She does note that she has headaches however this is a chronic problem rather than something that is new for her.  Patient has a remote history of moving around Halloween decorations which may correspond to timing of symptoms.      PAST MEDICAL HISTORY   No past medical history on file.    SURGICAL HISTORY     No past surgical history on file.    CURRENT MEDICATIONS       Previous Medications    No medications on file       ALLERGIES     Codeine and Iodinated contrast media    FAMILY HISTORY     No family history on file.     SOCIAL HISTORY       Social History     Socioeconomic History    Marital status:      Social Drivers of Health     Financial Resource Strain: Low Risk  (10/24/2023)    Received from [facility]    Overall Financial Resource Strain (CARDIA)     Difficulty of Paying Living Expenses: Not hard at all   Food Insecurity: No Food Insecurity (10/24/2023)    [facility]    Hunger Vital Sign     Worried About Running Out of Food in the Last Year: Never true     Ran Out of Food in the Last Year: Never true   Transportation Needs: No Transportation Needs  (10/24/2023)    Received from [facility]    PRAPARE - Transportation     Lack of Transportation (Medical): No     Lack of Transportation (Non-Medical): No   Physical Activity: Inactive (11/14/2022)    Received from [facility]    Exercise Vital Sign     Days of Exercise per Week: 0 days     Minutes of Exercise per Session: 0 min   Stress: No Stress Concern Present (10/24/2023)    Received from [facility]ational [facility] - Occupational Stress Questionnaire     Feeling of Stress : Only a little   Social Connections: Moderately Integrated (11/14/2022)    Received from [facility]    Social Connection and Isolation Panel [NHANES]     Frequency of Communication with Friends and Family: Three times a week     Frequency of Social Gatherings with Friends and Family: Twice a week     Attends [employer] Services: 1 to 4 times per year     Active Member of Clubs or Organizations: No     Attends Club or Organization Meetings: 1 to 4 times per year     Marital Status:    Housing Stability: Low Risk  (4/14/2023)    Received from [facility]    Housing Stability Vital Sign     Unable to Pay for Housing in the Last Year: No     Number of Places Lived in the Last Year: 1     Unstable Housing in the Last Year: No       PHYSICAL EXAM       ED Triage Vitals [10/21/24 0939]   Temperature Heart Rate Respirations BP   37 °C (98.6 °F) 90 20 (!) 183/86      Pulse Ox Temp Source Heart Rate Source Patient Position   97 % Tympanic Monitor Sitting      BP Location FiO2 (%)     Right arm --       Physical Exam  HENT:      Head: Normocephalic and atraumatic.      Mouth/Throat:      Mouth: Mucous membranes are moist.   Cardiovascular:      Rate and Rhythm: Normal rate and regular rhythm.      Heart sounds: Normal heart sounds.   Pulmonary:      Effort: Pulmonary effort is normal.      Breath sounds:  Normal breath sounds.   Abdominal:      General: Abdomen is flat. Bowel sounds are normal. There is no distension.      Palpations: Abdomen is soft.      Tenderness: There is generalized abdominal tenderness.      Comments: Patient repeatedly jumps up on palpation of the abdomen however per patient and family in room this is not unusual for her given her history of sensitivity to touch/pain.  Otherwise abdomen is soft without distention.   Skin:     General: Skin is warm and dry.   Neurological:      Mental Status: She is alert.          DIAGNOSTIC RESULTS     LABS:  Labs Reviewed   URINALYSIS WITH REFLEX CULTURE AND MICROSCOPIC    Narrative:     The following orders were created for panel order Urinalysis with Reflex Culture and Microscopic.  Procedure                               Abnormality         Status                     ---------                               -----------         ------                     Urinalysis with Reflex C...[[id number]]                                                 Extra Urine Gray Tube[[id number]]                                                         Please view results for these tests on the individual orders.   CBC WITH AUTO DIFFERENTIAL   COMPREHENSIVE METABOLIC PANEL   LIPASE   LACTATE   URINALYSIS WITH REFLEX CULTURE AND MICROSCOPIC   EXTRA URINE GRAY TUBE   TROPONIN I, HIGH SENSITIVITY       All other labs were within normal range or not returned as of this dictation.    Imaging  XR ankle bilateral 2 views    (Results Pending)        Procedures  Procedures     EMERGENCY DEPARTMENT COURSE/MDM:   Medical Decision Making  Patient is a 89-year-old female presenting to the emergency department for weeks of abdominal/pelvic pain that acutely worsened since 10/18.  She denies any other symptoms associate with this sharp pelvic pain other than that it worsens upon sitting and is improved upon lying flat.  Exam showed some tenderness of the abdomen and she is a 89-year-old female  therefore we will get CBC, CMP, lactate, lipase, and UA.  Will wait up on lactate to determine whether or not we proceed with CT angio versus CT abdomen pelvis.   Lactate was normal therefore CT abdomen and pelvis with contrast was ordered which revealed an acute diverticulitis. Patient is agreeable for plan to return home with short course of oxycodone and no antibiotics as patient has a history of recurrent C. difficile. Per newest recommendations, confirmed with discussion with Pharmacy, do not require antibiotics for acute diverticulitis. Patient advised to return should symptoms worsen or begin to have nausea vomiting or bowel changes.    ED Course as of 10/21/24 1727   Mon Oct 21, 2024   1037 Heart Rate: 90  Relative tachycardia, was 60s-70s on last ED visit []      ED Course User Index  [] Denilson Salas MD         Diagnoses as of 10/21/24 1727   Diverticulitis        External records reviewed: recent inpatient, clinic, and prior ED notes  Labs and Diagnostic imaging independently reviewed/interpreted by me.    Patient plan, care, lab results and imaging were all discussed with attending.    ED Medications administered this visit:  Medications - No data to display  New Prescriptions from this visit:    New Prescriptions    No medications on file       (Please note that portions of this note were completed with a voice recognition program.  Efforts were made to edit the dictations but occasionally words are mis-transcribed.)      Pritesh Thornton MD  Resident  10/21/24 1729       Denilson Salas MD  10/22/24 4854

## 2024-10-21 NOTE — DISCHARGE INSTRUCTIONS
Please return to the ER or seek immediate medical attention if you experience new or worsening abdominal pain, persistent vomiting, persistent diarrhea, black tar stools, fever of 38C (100.4) or higher, chest pain, shortness of breath, or worsening of your current symptoms.    You are welcome back any time. Thank you for entrusting your care to us, I hope we made your visit as pleasant as possible. Wishing you well!    Dr. Thornton

## 2024-10-21 NOTE — ED TRIAGE NOTES
"Patient states,\" lasted couple weeks been having lower sharp pelvic pains, no bleeding, no problems voiding\". \" I have discomfort when I sit down& leaning back in chair\". HX of bladder lift & Diverticulitis Pt denies sob, fever, chills, N, V..   "

## 2024-10-23 LAB — BACTERIA UR CULT: NORMAL

## 2024-10-27 LAB
ATRIAL RATE: 62 BPM
P AXIS: 89 DEGREES
P OFFSET: 166 MS
P ONSET: 124 MS
PR INTERVAL: 208 MS
Q ONSET: 228 MS
QRS COUNT: 10 BEATS
QRS DURATION: 86 MS
QT INTERVAL: 424 MS
QTC CALCULATION(BAZETT): 430 MS
QTC FREDERICIA: 428 MS
R AXIS: -11 DEGREES
T AXIS: 172 DEGREES
T OFFSET: 440 MS
VENTRICULAR RATE: 62 BPM